# Patient Record
Sex: FEMALE | Race: WHITE | NOT HISPANIC OR LATINO | Employment: OTHER | URBAN - METROPOLITAN AREA
[De-identification: names, ages, dates, MRNs, and addresses within clinical notes are randomized per-mention and may not be internally consistent; named-entity substitution may affect disease eponyms.]

---

## 2022-02-23 ENCOUNTER — TELEPHONE (OUTPATIENT)
Dept: WOUND CARE | Facility: CLINIC | Age: 87
End: 2022-02-23
Payer: MEDICARE

## 2022-02-23 NOTE — TELEPHONE ENCOUNTER
Scheduled patient for 4/4/22 with Dr. Harris but wants sooner.   Please call daughter Vera with any sooner appointments.

## 2022-02-23 NOTE — TELEPHONE ENCOUNTER
New patient.   Daughter Vera called as she knows Dr. Harris and would like him to see her mom for her neck wound which she has had for a very long time.  She had cancer with radiation and bone reconstruction.   She was not in [facility].   Brett will call with insurance info.

## 2022-02-25 ENCOUNTER — HOSPITAL ENCOUNTER (OUTPATIENT)
Dept: WOUND CARE | Facility: CLINIC | Age: 87
Discharge: HOME | End: 2022-02-25
Attending: PHYSICIAN ASSISTANT | Admitting: PHYSICIAN ASSISTANT
Payer: MEDICARE

## 2022-02-25 VITALS
TEMPERATURE: 97.7 F | SYSTOLIC BLOOD PRESSURE: 139 MMHG | HEART RATE: 76 BPM | WEIGHT: 114 LBS | DIASTOLIC BLOOD PRESSURE: 80 MMHG | HEIGHT: 62 IN | BODY MASS INDEX: 20.98 KG/M2

## 2022-02-25 DIAGNOSIS — L98.492 SKIN ULCER OF LEFT SIDE OF NECK WITH FAT LAYER EXPOSED (H): ICD-10-CM

## 2022-02-25 DIAGNOSIS — L97.922 ULCER OF LEFT LOWER EXTREMITY WITH FAT LAYER EXPOSED (H): ICD-10-CM

## 2022-02-25 DIAGNOSIS — L98.491 ULCER, SKIN, CHRONIC, LIMITED TO BREAKDOWN OF SKIN (H): Primary | ICD-10-CM

## 2022-02-25 PROBLEM — G31.84 MILD COGNITIVE IMPAIRMENT: Status: ACTIVE | Noted: 2018-10-24

## 2022-02-25 PROBLEM — D62 POSTOPERATIVE ANEMIA DUE TO ACUTE BLOOD LOSS: Status: ACTIVE | Noted: 2018-08-30

## 2022-02-25 PROBLEM — S72.009A CLOSED FRACTURE OF HIP (H): Status: ACTIVE | Noted: 2018-08-29

## 2022-02-25 PROBLEM — Z85.820 PERSONAL HISTORY OF MALIGNANT MELANOMA OF SKIN: Status: ACTIVE | Noted: 2021-09-17

## 2022-02-25 PROBLEM — I10 ESSENTIAL HYPERTENSION: Status: ACTIVE | Noted: 2022-02-25

## 2022-02-25 PROBLEM — M81.0 OSTEOPOROSIS: Status: ACTIVE | Noted: 2019-07-31

## 2022-02-25 PROBLEM — I44.7 LBBB (LEFT BUNDLE BRANCH BLOCK): Status: ACTIVE | Noted: 2018-08-29

## 2022-02-25 PROBLEM — M48.50XA VERTEBRAL COMPRESSION FRACTURE (H): Status: ACTIVE | Noted: 2019-07-31

## 2022-02-25 PROBLEM — R79.89 ELEVATED BRAIN NATRIURETIC PEPTIDE (BNP) LEVEL: Status: ACTIVE | Noted: 2018-08-30

## 2022-02-25 PROBLEM — R68.84 JAW PAIN: Status: ACTIVE | Noted: 2021-08-26

## 2022-02-25 PROBLEM — J44.9 PULMONARY HYPERTENSION DUE TO CHRONIC OBSTRUCTIVE PULMONARY DISEASE (H): Status: ACTIVE | Noted: 2018-09-03

## 2022-02-25 PROBLEM — I27.23 PULMONARY HYPERTENSION DUE TO CHRONIC OBSTRUCTIVE PULMONARY DISEASE (H): Status: ACTIVE | Noted: 2018-09-03

## 2022-02-25 PROBLEM — R13.10 DYSPHAGIA: Status: ACTIVE | Noted: 2022-02-25

## 2022-02-25 PROBLEM — I16.0 HYPERTENSIVE URGENCY: Status: ACTIVE | Noted: 2021-08-31

## 2022-02-25 PROBLEM — S02.640A: Status: ACTIVE | Noted: 2021-08-31

## 2022-02-25 PROCEDURE — 97602 WOUND(S) CARE NON-SELECTIVE: CPT

## 2022-02-25 PROCEDURE — 99204 OFFICE O/P NEW MOD 45 MIN: CPT | Performed by: PHYSICIAN ASSISTANT

## 2022-02-25 PROCEDURE — G0463 HOSPITAL OUTPT CLINIC VISIT: HCPCS | Mod: 25

## 2022-02-25 RX ORDER — LEVOTHYROXINE SODIUM 75 UG/1
TABLET ORAL
COMMUNITY
Start: 2022-01-09

## 2022-02-25 RX ORDER — TRAZODONE HYDROCHLORIDE 50 MG/1
TABLET, FILM COATED ORAL
COMMUNITY
Start: 2021-09-18

## 2022-02-25 NOTE — PROGRESS NOTES
Patient arrived for wound care visit. Certified Wound Care Nurse time spent evaluating patient record, completed a full evaluation and documented wound(s) & hugh-wound skin; provided recommendation based on treatment plan. Applied dressing, reviewed discharge instructions, patient education, and discussed plan of care with appropriate medical team staff members and patient and/or family members.

## 2022-02-25 NOTE — PROGRESS NOTES
[facility]    ASSESSMENT:   1. 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 Suspect poor healing is due to ischemia from radiation. Discussed that HBOT has been shown to be effective for these type of wounds, however is quite a time commitment. We can certainly make some improvements in local wound care and macro and micronutrients to try to improve tissue perfusion. Encouraged her to discuss HBOT with family and decide if this is something they would like to pursue, I would be happy to place a referral.   2. Wound care plan: vashe soak, plurogel, Hydrofera Blue, gauze, silicone tape changed every other day - do this to neck and leg wounds  3. Nutrition: vit D 5kIU/day, B vitamins, arginaid or Angel, vit C  4. See bottom of note for detailed wound care and patient instructions    HISTORY OF PRESENT ILLNESS:   Eve Reddy is a relatively healthy 88 year old female who presents today for a neck wound. Her daughter is a [employer] friend of Dr. Santy Harris.  History of melanoma with radiation >20 years ago. 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 This was doing well until last year when she developed pain and was noted to have a fracture in the hardware. In August of 2021 the broken portion was removed and the area was closed. Unfortunately she had dehiscence fairly early on. She has done a variety of wound care but sounds like this has mostly been at the suggestion of her surgeon and some nurse friends rather than a wound clinic. Has not discussed hyperbaric with anyone. She has poor nutrition due to trouble swallowing, and survives mostly off of Ensure. Her son inquires about improving diet and micronutrients. She is otherwise remarkably healthy. Also of note, the site of her fibula resection has a shallow ulcer overlying it which waxes and wanes.     VITALS: /80 (BP Location:  "Left arm, Patient Position: Sitting)   Pulse 76   Temp 97.7  F (36.5  C) (Temporal)   Ht 1.575 m (5' 2\")   Wt 51.7 kg (114 lb)   BMI 20.85 kg/m       PHYSICAL EXAM:  GENERAL: Patient is alert and oriented and in no acute distress  CV: palpable pedal pulses  INTEGUMENTARY:   Wound (used by OP [facility]I only) 22 1011 Left neck;jaw (Active)   Base pink;slough 22 1000   Periwound intact 22 1000   Periwound Temperature warm 22 1000   Periwound Skin Turgor soft 22 1000   Edges open 22 1000   Length (cm) 5.1 22 1000   Width (cm) 2.1 22 1000   Depth (cm) 0.1 22 1000   Wound (cm^2) 10.71 cm^2 22 1000   Wound Volume (cm^3) 1.07 cm^3 22 1000   Drainage Characteristics/Odor serous 22 1000   Drainage Amount moderate 22 1000       Wound (used by OP WHI only) 22 1031 Left lateral;lower leg (Active)   Thickness/Stage partial thickness 22 1000   Base scab 22 1000   Periwound intact 22 1000   Periwound Temperature warm 22 1000   Periwound Skin Turgor firm 22 1000   Edges open 22 1000   Length (cm) 8.5 22 1000   Width (cm) 0.8 22 1000   Depth (cm) 0 22 1000   Wound (cm^2) 6.8 cm^2 22 1000   Wound Volume (cm^3) 0 cm^3 22 1000   Drainage Characteristics/Odor serous 22 1000   Drainage Amount moderate 22 1000             MDM: 45-59 minutes were spent on the date of the visit reviewing previous chart notes, evaluating patient and developing the treatment plan, this excludes any time spent on procedures.     PATIENT INSTRUCTIONS      Further instructions from your care team       Eve Reddy      1933    Micronutrients/supplements to aid in healin. 1 packet of Angel Supplement (or L-arginine) into your favorite beverage twice a day. Purchase at [facility] in [address] [employer] or [employer]  2. Vitamin D3 5,000 iu per day  3. Vitamin C 2,000 mg " daily  4. Methyl Folate 1000 mcg daily   5. Vitamin B 12 1000 mcg daily    Wound Dressing Change: left neck and left leg   After cleansing with mild unscented soap and water, apply small amount of VASHE on gauze, lay into wound bed, let sit for 15-30 minutes, remove gauze (do not rinse) then apply dressing:   Cover wound with Plurogel & Hydrofera Blue  Leg:  Wrap with yaneth;  Neck: Secure with silicone tape  (Use skin prep under the tape as needed)  Change dressing every other day    To Order Plurogel: [email or web address] or call [phone] to place an order for 0.7 oz jar or tube  Use PLUROHEALS (all caps) at checkout in the discount code section to decrease price to $55      Raegan Rodriguez PA-C. February 25, 2022    Call us at [phone] if you have any questions about your wounds, have redness or swelling around your wound, have a fever of 101 or greater or if you have any other problems or concerns. We answer the phone Monday through Friday 8 am to 4 pm, please leave a message as we check the voicemail frequently throughout the day.     If you had a positive experience please indicate on your patient satisfaction survey form that [facility] will be sending you.    If you have any billing related questions please call the [facility] Business office at [phone]. The clinic staff does not handle billing related matters.       Durable Medical Equipment Wound Care Orders     Wound Care Order for DME - ONLY FOR DME   As directed      DME Provider: [facility]    Wound Supply Order Options: Complex Wound    Optional: .dmewound can be used to pull in order specific information into documentation    Wound Number:  Wound 1  Wound 2       Wound 1 Location: left neck    Wound 1 Dressing Change Frequency: QOD    Wound 1 Length of Need: 30 days    Wound 1 - Dressing Supplies:  Primary  Tape/Securing       Wound 1 - Primary Dressing Dispensing Instructions: Ok to Substitute    Wound 1 - Primary  Dressing Types: Foam    Wound 1 - Foam Types: Hydrofera Blue Ready Transfer []    Wound 1 - Hydrofera Blue Ready Transfer Size: 4 x 5    Wound 1 - Hydrofera Blue Ready Transfer Quantity: 12    Wound 1 - Tape Type: Silicone []    Wound 1 - Silcone Size: 2 x 5    Wound 1 - Silicone Quantity: 1 Roll    Wound 2 Location: left lower leg    Wound 2 Dressing Change Frequency: QOD    Wound 2 Length of Need: 30 days    Wound 2 - Dressing Supplies:  Primary  Wrap/Gauze  Tape/Securing       Wound 2 - Primary Dressing Dispensing Instructions: Ok to Substitute    Wound 2 - Primary Dressing Types: Foam    Wound 2 - Foam Types: Hydrofera Blue Ready Transfer []    Wound 2 - Hydrofera Blue Ready Transfer Size: 4 x 5    Wound 2 - Hydrofera Blue Ready Transfer Quantity: 12    Wound 2 - Wrap/Gauze Types:  Loafs  Rolls       Wound 2 - Roll Type: Bandage Roll Gauze []    Wound 2 - Bandage Roll Gauze Size: 4.5 x 4.1    Wound 2 - Bandage Roll Gauze Quantity: 30 Rolls    Wound 2 - Loaf Type: Gauze Loaf []    Wound 2 - Gauze Loaf Size: 4 x 4    Wound 2 - Gauze Loaf Quantity: 200    Wound 2 - Tape Type: Medipore []    Wound 2 - Medipore Size: 2 x 10    Wound 2 - Medipore Quantity: 1 Roll    Skin ulcer of left side of neck with fat layer exposed (H)  Ulcer of left lower extremity with fat layer exposed (H)    Wound Care Order for DME - ONLY FOR DME   As directed      DME Provider: [facility]    Wound Supply Order Options: Complex Wound    Optional: .dmewound can be used to pull in order specific information into documentation    Wound Number: Wound 1    Wound 1 Location: left neck and left lower leg    Wound 1 Dressing Change Frequency: QOD    Wound 1 Length of Need: 30 days    Wound 1 - Dressing Supplies: Cleanser    Wound 1 - Cleanser Types: Vashe    Wound 1 - Vashe Cleanser Size: 8.5 oz    Wound 1 - Vashe Cleanser Quantity: 1    Skin ulcer of left side of neck with fat layer exposed (H)  Ulcer of  left lower extremity with fat layer exposed (H)          Electronically signed by Raegan Rodriguez PA-C on February 25, 2022

## 2022-02-25 NOTE — DISCHARGE INSTRUCTIONS
Eve Reddy      1933    Micronutrients/supplements to aid in healin. 1 packet of Angel Supplement (or L-arginine) into your favorite beverage twice a day. Purchase at [facility] [employer] in [address] [employer] or [facility]  2. Vitamin D3 5,000 iu per day  3. Vitamin C 2,000 mg daily  4. Methyl Folate 1000 mcg daily   5. Vitamin B 12 1000 mcg daily    Wound Dressing Change: left neck and left leg   After cleansing with mild unscented soap and water, apply small amount of VASHE on gauze, lay into wound bed, let sit for 15-30 minutes, remove gauze (do not rinse) then apply dressing:   Cover wound with Plurogel & Hydrofera Blue  Leg:  Wrap with yaneth;  Neck: Secure with silicone tape  (Use skin prep under the tape as needed)  Change dressing every other day    To Order Plurogel: [email or web address] or call [phone] to place an order for 0.7 oz jar or tube  Use PLUROHEALS (all caps) at checkout in the discount code section to decrease price to $55      Raegan Rodriguez PA-C. 2022    Call us at [phone] if you have any questions about your wounds, have redness or swelling around your wound, have a fever of 101 or greater or if you have any other problems or concerns. We answer the phone Monday through Friday 8 am to 4 pm, please leave a message as we check the voicemail frequently throughout the day.     If you had a positive experience please indicate on your patient satisfaction survey form that [facility] will be sending you.    If you have any billing related questions please call the [facility] Business office at [phone]. The clinic staff does not handle billing related matters.

## 2022-03-02 ENCOUNTER — TELEPHONE (OUTPATIENT)
Dept: WOUND CARE | Facility: CLINIC | Age: 87
End: 2022-03-02
Payer: MEDICARE

## 2022-03-02 NOTE — TELEPHONE ENCOUNTER
"Vera states that her mom is \"absent minded and keeps taking the dressing off\" so they are going thru a lot of supplies.    Suggested to use a tube sock, cut off the feet and pull up to secure the dressing and prevent her mom from removing the roll gauze. Vera stated that her dad was using the entire 4\" roll gauze and it was falling down pt's leg. Encouraged to use minimal roll gauze to prevent over use of dressings.    Vera was instructed to place the Pluragel into the fridge to use less product.     All concerns were addressed.  "

## 2022-03-02 NOTE — TELEPHONE ENCOUNTER
Eve's daughter Vera called because she is running out of supplies and needs more ordered.   Please call Vera at .

## 2022-04-04 ENCOUNTER — HOSPITAL ENCOUNTER (OUTPATIENT)
Dept: WOUND CARE | Facility: CLINIC | Age: 87
Discharge: HOME | End: 2022-04-04
Attending: SURGERY | Admitting: SURGERY
Payer: MEDICARE

## 2022-04-04 VITALS — TEMPERATURE: 97.4 F | DIASTOLIC BLOOD PRESSURE: 70 MMHG | SYSTOLIC BLOOD PRESSURE: 129 MMHG | HEART RATE: 75 BPM

## 2022-04-04 DIAGNOSIS — L98.491 ULCER, SKIN, CHRONIC, LIMITED TO BREAKDOWN OF SKIN (H): Primary | ICD-10-CM

## 2022-04-04 DIAGNOSIS — L97.922 ULCER OF LEFT LOWER EXTREMITY WITH FAT LAYER EXPOSED (H): ICD-10-CM

## 2022-04-04 DIAGNOSIS — L98.492 SKIN ULCER OF LEFT SIDE OF NECK WITH FAT LAYER EXPOSED (H): ICD-10-CM

## 2022-04-04 PROCEDURE — 99213 OFFICE O/P EST LOW 20 MIN: CPT | Performed by: SURGERY

## 2022-04-04 PROCEDURE — 97602 WOUND(S) CARE NON-SELECTIVE: CPT

## 2022-04-04 NOTE — DISCHARGE INSTRUCTIONS
Eve Reddy      1933    Micronutrients/supplements to aid in healin packet of Angel Supplement (or L-arginine) into your favorite beverage twice a day.  Purchase at [facility] in [address] [facility] [employer] or  [employer]  Vitamin D3 10,000 iu per day  Samantha C 1,000 mg take twice daily  Vitamin B Complex with zinc take 1 tablet daily   Vitamin B 12 1000 mcg daily  Vein Formula 500mg capsule twice daily order from www.[employer] or call [phone]. Alternatively, if Vein Formula is not available Hesperidin Diosmin 1000mg could be ordered from [employer]. [employer] is a good brand.    Wound Dressing Change: Left neck   After cleansing with mild unscented soap and water, apply small amount of VASHE on  gauze, lay into wound bed, let sit for 15-30 minutes by lying on right side, remove gauze (do not rinse) then  apply dressing:  Apply cavilon skin prep to intact skin surrounding wound  Cover wound with Plurogel then 1/8th piece of Hydrofera Blue transfer ready  Secure with medipore tape (Use skin prep under the tape as needed)  Change dressing daily    Wound Dressing Change: Left neck   After cleansing with mild unscented soap and water, apply small amount of VASHE on gauze, lay into wound bed, let sit for 15-30 minutes, remove gauze (do not rinse) then apply dressing:  Apply cavilon skin prep to intact skin surrounding wound  Apply plurogel to wound then apply navy stripe edemawear from toes to knee. Put plurogel in the fridge where it becomes thinner. You will use less of the plurogel this way, extending the use of the tube, and the plurogel will be more soothing.  Edemawear should be worn 24/7 unless bathing/showering or changing the dressing. Then apply 1/6th piece of hydrofera blue transfer ready cut to fit to size of wound over the edemawear and secure with 1 4x4 gauze/1 roll gauze and tape. Change daily.    DO NOT CUT THE EDEMAWEAR. IF IT IS TOO LONG THEN CUFF THE EDEMAWEAR (the  edemawear can shrink length wise with washing)    To Order Plurogel: [email or web address] or call [phone] to place an order for 0.7  oz jar or tube  Use PLUROHEALS (all caps) at checkout in the discount code section to decrease price  to $55     MRishabh Harris M.D. April 4, 2022      Call us at [phone] if you have any questions about your wounds, have redness or swelling around your wound, have a fever of 101 or greater or if you have any other problems or concerns. We answer the phone Monday through Friday 8 am to 4 pm, please leave a message as we check the voicemail frequently throughout the day.     If you had a positive experience please indicate that on your patient satisfaction survey form that [facility] will be sending you.    It was a pleasure meeting with you today.  Thank you for allowing me and my team the privilege of caring for you today.  YOU are the reason we are here, and I truly hope we provided you with the excellent service you deserve.  Please let us know if there is anything else we can do for you so that we can be sure you are leaving completely satisfied with your care experience.      If you have any billing related questions please call the [facility] Business office at [phone]. The clinic staff does not handle billing related matters.

## 2022-04-04 NOTE — PROGRESS NOTES
[facility] Progress Note    Subject: Eve Reddy history of melanoma surgical resection left neck with subsequent extensive radiation, and osteonecrosis of the jaw bone, required plating, subsequent fracture within the past 2 years, required surgical excision at the [facility], subsequent onset of a chronic wound in this position.  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  She essentially lives on 3 cans of Ensure and a daily basis, is maintaining weight, can drink through a straw, can ambulate with a walker, lives with her  of 66 years.  No recurrent malignancy today.    PMH: No past medical history on file.  Patient Active Problem List   Diagnosis     Aseptic necrosis of bone (H)     Cataract     Closed fracture of hip (H)     Closed fracture of ramus of mandible (H)     Vertebral compression fracture (H)     Degeneration of lumbar or lumbosacral intervertebral disc     Dysphagia     Elevated brain natriuretic peptide (BNP) level     Essential hypertension     Hearing loss     Herpes zoster     Personal history of malignant melanoma of skin     Hypertensive urgency     Hypothyroidism     Jaw pain     LBBB (left bundle branch block)     Melanoma of skin (H)     Malignant neoplasm of female breast (H)     Mild cognitive impairment     Osteoporosis     Osteoradionecrosis of mandible     Postoperative anemia due to acute blood loss     Pulmonary eosinophilia (H)     Pulmonary hypertension due to chronic obstructive pulmonary disease (H)     Ulcer, skin, chronic, limited to breakdown of skin (H)     Skin ulcer of left side of neck with fat layer exposed (H)     Ulcer of left lower extremity with fat layer exposed (H)     Social Hx:   Social History     Socioeconomic History     Marital status:      Spouse name: Not on file     Number of children: Not on file     Years of education: Not on file     Highest education level: Not on file   Occupational History     Not  on file   Tobacco Use     Smoking status: Not on file     Smokeless tobacco: Not on file   Substance and Sexual Activity     Alcohol use: Not on file     Drug use: Not on file     Sexual activity: Not on file   Other Topics Concern     Not on file   Social History Narrative     Not on file     Social Determinants of Health     Financial Resource Strain: Not on file   Food Insecurity: Not on file   Transportation Needs: Not on file   Physical Activity: Not on file   Stress: Not on file   Social Connections: Not on file   Intimate Partner Violence: Not on file   Housing Stability: Not on file       Surgical Hx: No past surgical history on file.    Allergies:    Allergies   Allergen Reactions     Alendronic Acid Other (See Comments)     PN: LW Reaction: osteoradionecrosis of mandible  PN: LW Reaction: osteoradionecrosis of mandible       Tramadol Nausea and Vomiting       Medications:   Current Outpatient Medications   Medication     Capsaicin-Menthol 0.025-1.25 % PTCH     levothyroxine (SYNTHROID/LEVOTHROID) 75 MCG tablet     traZODone (DESYREL) 50 MG tablet     No current facility-administered medications for this encounter.       Labs: No results for input(s): ALBUMIN, HGB, INR, WBC, A1C, CRP in the last 53310 hours.    Invalid input(s): PREALBUMIN,  GLUCOSE, MICROBIO  No results found for: CR  No results found for: GFRESTIMATED  No results found for: GFRESTBLACK  No results found for: WBC  No results found for: RBC  No results found for: HGB  No results found for: HCT  No components found for: MCT  No results found for: MCV  No results found for: MCH  No results found for: MCHC  No results found for: RDW  No results found for: PLT       Nutrition requirements were discussed with patient today.  Objective:  /70 (BP Location: Left arm)   Pulse 75   Temp 97.4  F (36.3  C) (Temporal)   Wound (used by OP WHI only) 02/25/22 1011 Left neck;jaw (Active)   Thickness/Stage full thickness 04/04/22 1442   Base  pink;slough 04/04/22 1442   Periwound intact 04/04/22 1442   Periwound Temperature warm 04/04/22 1442   Periwound Skin Turgor soft 04/04/22 1442   Edges open 04/04/22 1442   Length (cm) 4 04/04/22 1442   Width (cm) 1.7 04/04/22 1442   Depth (cm) 0 04/04/22 1442   Wound (cm^2) 6.8 cm^2 04/04/22 1442   Wound Volume (cm^3) 0 cm^3 04/04/22 1442   Wound healing % 36.51 04/04/22 1442   Drainage Characteristics/Odor serous 04/04/22 1442   Drainage Amount moderate 04/04/22 1442   Care, Wound non-select wound debridement performed 04/04/22 1442       Wound (used by OP WHI only) 02/25/22 1031 Left lateral;lower leg (Active)   Thickness/Stage partial thickness 04/04/22 1442   Base scab 04/04/22 1442   Periwound intact 04/04/22 1442   Periwound Temperature warm 04/04/22 1442   Periwound Skin Turgor firm 04/04/22 1442   Edges open 04/04/22 1442   Drainage Characteristics/Odor serous 04/04/22 1442   Drainage Amount moderate 04/04/22 1442   Care, Wound non-select wound debridement performed 04/04/22 1442        General:  Patient is alert and orientated, no acute distress.  Conversant    Vascular: Palpable left pedal pulses.  Significant radiation changes at the left neck, wound improved since evaluation previously in wound clinic, decreased depth, decrease bioburden, area is very hypertrophic, indurated, chronic inflammation.  Left leg wound, lateral calf, shallow ulcerations without cellulitis, no undermining.        Impression: History of melanoma left neck status post radiation therapy, osteonecrosis of left jaw with subsequent fracture of metal plate and surgical incision, 12-month history of left neck wound in the midst of extensive radiation changes and induration, left lateral calf wound and chronic interstitial edema      Plan: Lie on right side, hypochlorous acid Vashe soaked dressings on neck and calf for 15 to 20 minutes, then apply PluroGel, foam, EdemaWear on the left lower extremity, 3M specific Cavilon to periwound  margins left neck, hold in place with Medipore tape, change all dressings on a daily basis, adjunctive micronutrients, Angel 1 pack twice a day, micronized purified flavonoid fraction 1 tablet daily.  Patient will return to the clinic in 6 weeks time.          Owen Harris MD on 4/4/2022 at 2:55 PM          Dictated using Dragon voice recognition software which may result in transcription errors

## 2022-05-18 ENCOUNTER — HOSPITAL ENCOUNTER (OUTPATIENT)
Dept: WOUND CARE | Facility: CLINIC | Age: 87
Discharge: HOME | End: 2022-05-18
Attending: SURGERY | Admitting: SURGERY
Payer: MEDICARE

## 2022-05-18 VITALS — HEART RATE: 78 BPM | SYSTOLIC BLOOD PRESSURE: 151 MMHG | DIASTOLIC BLOOD PRESSURE: 64 MMHG | TEMPERATURE: 97.7 F

## 2022-05-18 DIAGNOSIS — L98.492 SKIN ULCER OF LEFT SIDE OF NECK WITH FAT LAYER EXPOSED (H): ICD-10-CM

## 2022-05-18 DIAGNOSIS — L97.922 ULCER OF LEFT LOWER EXTREMITY WITH FAT LAYER EXPOSED (H): Primary | ICD-10-CM

## 2022-05-18 PROCEDURE — 97602 WOUND(S) CARE NON-SELECTIVE: CPT

## 2022-05-18 PROCEDURE — 99213 OFFICE O/P EST LOW 20 MIN: CPT | Performed by: SURGERY

## 2022-05-18 NOTE — DISCHARGE INSTRUCTIONS
Eve Reddy      1933    Micronutrients/supplements to aid in healin packet of Angel Supplement (or L-arginine) into your favorite beverage twice a day.  Purchase at [facility] in [address] [facility] [employer] or [employer]  Vitamin D3 10,000 iu per day  Samantha C 1,000 mg take daily  Vitamin B Complex with zinc take 1 tablet daily  Vitamin B 12 1000 mcg daily  Vein Formula 500mg capsule twice daily order from [email or web address] or call [phone].     Wound Dressing Change: Left neck  After cleansing with mild unscented soap and water, apply small amount of VASHE on  gauze, lay into wound bed, let sit for 15-30 minutes by lying on right side, remove  gauze (do not rinse) then apply dressing:  Apply cavilon skin prep to intact skin surrounding wound  Cover wound with Plurogel then allow to dry  Change dressing daily    Wound Dressing Change: Left lower leg  Cleanse with mild unscented soap and water.  Apply cavilon skin prep to intact skin surrounding wound  Apply plurogel to wound and allow to dry then apply navy stripe edemawear from toes to knee.   Change daily    Put plurogel in the fridge where it becomes thinner. You will use less of the plurogel  this way, extending the use of the tube, and the plurogel will be more soothing.    Edemawear should be worn 24/7 unless bathing/showering or changing the dressing.    DO NOT CUT THE EDEMAWEAR. IF IT IS TOO LONG THEN CUFF THE EDEMAWEAR (the  edemawear can shrink length wise with washing)    To Order Plurogel: [email or web address] or call [phone] to place an order for 0.7  oz jar or tube  Use PLUROHEALS (all caps) at checkout in the discount code section to decrease price to $55     MRishabh Harris M.D. May 18, 2022      Call us at [phone] if you have any questions about your wounds, have redness or swelling around your wound, have a fever of 101 or greater or if you have any other problems or concerns. We answer the phone Monday through  Friday 8 am to 4 pm, please leave a message as we check the voicemail frequently throughout the day.     If you had a positive experience please indicate that on your patient satisfaction survey form that [facility] will be sending you.    It was a pleasure meeting with you today.  Thank you for allowing me and my team the privilege of caring for you today.  YOU are the reason we are here, and I truly hope we provided you with the excellent service you deserve.  Please let us know if there is anything else we can do for you so that we can be sure you are leaving completely satisfied with your care experience.      If you have any billing related questions please call the [facility] Business office at [phone]. The clinic staff does not handle billing related matters.

## 2022-05-18 NOTE — PROGRESS NOTES
[facility]ound Healing [place] Progress Note    Subject: Eve Reddy radiation and extensive skin damage to left lateral neck status post management of malignancy, left leg wound has closed.  Left neck wound is improved by 62% with utilization of PluroGel and Cavilon to periwound skin margins    Patient Active Problem List   Diagnosis     Aseptic necrosis of bone (H)     Cataract     Closed fracture of hip (H)     Closed fracture of ramus of mandible (H)     Vertebral compression fracture (H)     Degeneration of lumbar or lumbosacral intervertebral disc     Dysphagia     Elevated brain natriuretic peptide (BNP) level     Essential hypertension     Hearing loss     Herpes zoster     Personal history of malignant melanoma of skin     Hypertensive urgency     Hypothyroidism     Jaw pain     LBBB (left bundle branch block)     Melanoma of skin (H)     Malignant neoplasm of female breast (H)     Mild cognitive impairment     Osteoporosis     Osteoradionecrosis of mandible     Postoperative anemia due to acute blood loss     Pulmonary eosinophilia (H)     Pulmonary hypertension due to chronic obstructive pulmonary disease (H)     Ulcer, skin, chronic, limited to breakdown of skin (H)     Skin ulcer of left side of neck with fat layer exposed (H)     Ulcer of left lower extremity with fat layer exposed (H)     Disorder of bone and cartilage     No past medical history on file.  Exam:  BP (!) 151/64 (BP Location: Left arm)   Pulse 78   Temp 97.7  F (36.5  C) (Temporal)   Wound (used by OP WHI only) 02/25/22 1011 Left neck;jaw (Active)   Thickness/Stage full thickness 05/18/22 1252   Base granulating 05/18/22 1252   Periwound intact 05/18/22 1252   Periwound Temperature warm 05/18/22 1252   Periwound Skin Turgor soft 05/18/22 1252   Edges open 05/18/22 1252   Length (cm) 2 05/18/22 1252   Width (cm) 2 05/18/22 1252   Depth (cm) 0.1 05/18/22 1252   Wound (cm^2) 4 cm^2 05/18/22 1252   Wound Volume (cm^3) 0.4 cm^3 05/18/22  1252   Wound healing % 62.65 22 1252   Drainage Characteristics/Odor serosanguineous 22 1252   Drainage Amount moderate 22 1252   Care, Wound non-select wound debridement performed 22 1252       Wound (used by OP WHI only) 22 1031 Left lateral;lower leg (Active)   Thickness/Stage full thickness 22 1252   Base granulating 22 1252   Periwound intact 22 1252   Periwound Temperature warm 22 1252   Periwound Skin Turgor soft 22 1252   Edges open 22 1252   Length (cm) 0.8 22 1252   Width (cm) 0.3 22 1252   Depth (cm) 0 22 1252   Wound (cm^2) 0.24 cm^2 22 1252   Wound Volume (cm^3) 0 cm^3 22 1252   Wound healing % 96.47 22 1252   Drainage Characteristics/Odor serosanguineous 22 1252   Drainage Amount moderate 22 1252   Care, Wound non-select wound debridement performed 22 1252     Improved left neck, mild biofilm, sclerosis, radiation-induced changes of dermis        Impression: History of malignancy left neck with radiation    Plan: We will dress the wounds with PluroGel, Cavilon to periwound skin, ideally would soak with hypochlorous acid for 15 minutes prior to application of PluroGel, can utilize PluroGel only on the neck wound, let completely dry,.  Light compression garment for left lower extremity and ceramide-based lotion application daily  Patient will return to the clinic in 4 weeks time      Further instructions from your care team       Eve Reddy      1933    Micronutrients/supplements to aid in healin packet of Angel Supplement (or L-arginine) into your favorite beverage twice a day.  Purchase at [facility] [employer] in [address] [facility], [employer] or [employer]  Vitamin D3 10,000 iu per day  Samantha C 1,000 mg take daily  Vitamin B Complex with zinc take 1 tablet daily  Vitamin B 12 1000 mcg daily  Vein Formula 500mg capsule twice daily order from [email or web address] or call  [phone].     Wound Dressing Change: Left neck  After cleansing with mild unscented soap and water, apply small amount of VASHE on  gauze, lay into wound bed, let sit for 15-30 minutes by lying on right side, remove  gauze (do not rinse) then apply dressing:  Apply cavilon skin prep to intact skin surrounding wound  Cover wound with Plurogel then allow to dry  Change dressing daily    Wound Dressing Change: Left lower leg  Cleanse with mild unscented soap and water.  Apply cavilon skin prep to intact skin surrounding wound  Apply plurogel to wound and allow to dry then apply navy stripe edemawear from toes to knee.   Change daily    Put plurogel in the fridge where it becomes thinner. You will use less of the plurogel  this way, extending the use of the tube, and the plurogel will be more soothing.    Edemawear should be worn 24/7 unless bathing/showering or changing the dressing.    DO NOT CUT THE EDEMAWEAR. IF IT IS TOO LONG THEN CUFF THE EDEMAWEAR (the  edemawear can shrink length wise with washing)    To Order Plurogel: shop.[employer] or call [phone] to place an order for 0.7  oz jar or tube  Use PLUROHEALS (all caps) at checkout in the discount code section to decrease price to $55     M. Santy Harris M.D. May 18, 2022      Call us at [phone] if you have any questions about your wounds, have redness or swelling around your wound, have a fever of 101 or greater or if you have any other problems or concerns. We answer the phone Monday through Friday 8 am to 4 pm, please leave a message as we check the voicemail frequently throughout the day.     If you had a positive experience please indicate that on your patient satisfaction survey form that [facility] will be sending you.    It was a pleasure meeting with you today.  Thank you for allowing me and my team the privilege of caring for you today.  YOU are the reason we are here, and I truly hope we provided you with the excellent service  you deserve.  Please let us know if there is anything else we can do for you so that we can be sure you are leaving completely satisfied with your care experience.      If you have any billing related questions please call the [facility] Business office at [phone]. The clinic staff does not handle billing related matters.           Owen Harris MD on 5/18/2022 at 4:12 PM      Dictated using Dragon voice recognition software which may result in transcription errors

## 2022-07-13 ENCOUNTER — HOSPITAL ENCOUNTER (OUTPATIENT)
Dept: WOUND CARE | Facility: CLINIC | Age: 87
Discharge: HOME | End: 2022-07-13
Attending: SURGERY | Admitting: SURGERY
Payer: MEDICARE

## 2022-07-13 VITALS — TEMPERATURE: 98 F | HEART RATE: 68 BPM | DIASTOLIC BLOOD PRESSURE: 70 MMHG | SYSTOLIC BLOOD PRESSURE: 148 MMHG

## 2022-07-13 DIAGNOSIS — L97.922 ULCER OF LEFT LOWER EXTREMITY WITH FAT LAYER EXPOSED (H): ICD-10-CM

## 2022-07-13 DIAGNOSIS — L98.492 SKIN ULCER OF LEFT SIDE OF NECK WITH FAT LAYER EXPOSED (H): Primary | ICD-10-CM

## 2022-07-13 PROCEDURE — 97602 WOUND(S) CARE NON-SELECTIVE: CPT

## 2022-07-13 PROCEDURE — 99212 OFFICE O/P EST SF 10 MIN: CPT | Performed by: SURGERY

## 2022-07-13 RX ORDER — LEVOTHYROXINE SODIUM 88 UG/1
88 TABLET ORAL DAILY
COMMUNITY
Start: 2022-06-12

## 2022-07-13 NOTE — DISCHARGE INSTRUCTIONS
Eve Reddy      6/9/1933    Micronutrients/supplements to aid in healing:  -1 packet of Angel Supplement (or L-arginine) into your favorite beverage twice a day  Can purchase at [facility] [address]  -Vitamin D3 10,000 iu per day  -Samantha C 1,000 mg take daily  -Vitamin B Complex with zinc take 1 tablet daily  -Vitamin B 12 1000 mcg daily  -Vein Formula 500mg capsule twice daily order from www.[employer] or call [phone].    Wound Dressing Change: Left Neck  -After cleansing with mild unscented soap and water, apply small amount of Vashe on gauze, lay into wound bed, let sit for 15-30 minutes by lying on right side, remove gauze (do not rinse) then apply dressing:  -Apply one 3M Cavilon Skin Prep the skin around the wound  -Apply a small amount of Plurogel over the wound.  Cover with a Tegarderm transparent film dressing.  -Change every other day    Wound Dressing Change: Left Lower Leg  -Cleanse with mild unscented soap and water  -Moisturize the leg with CeraVe lotion  -Apply one 3M Cavilon Skin Prep to intact skin surrounding wound  -Apply Navy Stripe EdemaWear from toes to knee  -Change daily    You can put Plurogel in the fridge, which will cause it to become thinner. You will use less of the Plurogel this way, extending the use of the tube, and the Plurogel will be more soothing.    To Order Plurogel: shop.[employer] or call [phone] to place an order for 0.7 oz jar or tube  Use PLUROHEALS (all caps) at checkout in the discount code section to decrease price to $55    Edemawear should be worn 24/7 unless bathing/showering or changing the dressing. DO NOT CUT THE EDEMAWEAR. IF IT IS TOO LONG THEN CUFF THE EDEMAWEAR (the EdemaWear can shrink length wise with washing)     BASIA Harris M.D. July 13, 2022    Call us at [phone] if you have any questions about your wounds, have redness or swelling around your wound, have a fever of 101 or  greater or if you have any other problems or concerns. We answer the phone Monday through Friday 8 am to 4 pm, please leave a message as we check the voicemail frequently throughout the day.     If you had a positive experience please indicate that on your patient satisfaction survey form that [facility] will be sending you.    It was a pleasure meeting with you today.  Thank you for allowing me and my team the privilege of caring for you today.  YOU are the reason we are here, and I truly hope we provided you with the excellent service you deserve.  Please let us know if there is anything else we can do for you so that we can be sure you are leaving completely satisfied with your care experience.      If you have any billing related questions please call the [facility] Business office at [phone]. The clinic staff does not handle billing related matters.

## 2022-07-13 NOTE — PROGRESS NOTES
[facility]ound Healing [place] Progress Note    Subject: Eve Reddy, mild cognitive disorder, progressive, pleasant, has been picking at bandages on the left side of the neck which is caused mild stress for the  who is trying to maintain bandage integrity.  Talked about options for management, we will proceed with hypochlorous acid Vashe soak for 15 minutes then application of small mount of PluroGel on the open wound, Cavilon to periwound skin, application of clear Tegaderm, can change every 48 hours, ideally with his low-profile dressing she would not be able to remove it.    Patient Active Problem List   Diagnosis     Aseptic necrosis of bone (H)     Cataract     Closed fracture of hip (H)     Closed fracture of ramus of mandible (H)     Vertebral compression fracture (H)     Degeneration of lumbar or lumbosacral intervertebral disc     Dysphagia     Elevated brain natriuretic peptide (BNP) level     Essential hypertension     Hearing loss     Herpes zoster     Personal history of malignant melanoma of skin     Hypertensive urgency     Hypothyroidism     Jaw pain     LBBB (left bundle branch block)     Melanoma of skin (H)     Malignant neoplasm of female breast (H)     Mild cognitive impairment     Osteoporosis     Osteoradionecrosis of mandible     Postoperative anemia due to acute blood loss     Pulmonary eosinophilia (H)     Pulmonary hypertension due to chronic obstructive pulmonary disease (H)     Ulcer, skin, chronic, limited to breakdown of skin (H)     Skin ulcer of left side of neck with fat layer exposed (H)     Ulcer of left lower extremity with fat layer exposed (H)     Disorder of bone and cartilage     No past medical history on file.  Exam:  BP (!) 148/70 (BP Location: Left leg, Patient Position: Sitting)   Pulse 68   Temp 98  F (36.7  C) (Temporal)   Wound (used by OP WHI only) 02/25/22 1011 Left neck;jaw (Active)   Thickness/Stage full thickness 07/13/22 1200   Base granulating  07/13/22 1200   Periwound intact 07/13/22 1200   Periwound Temperature warm 07/13/22 1200   Periwound Skin Turgor soft 07/13/22 1200   Edges open 07/13/22 1200   Length (cm) 2.5 07/13/22 1200   Width (cm) 1.2 07/13/22 1200   Depth (cm) 0.1 07/13/22 1200   Wound (cm^2) 3 cm^2 07/13/22 1200   Wound Volume (cm^3) 0.3 cm^3 07/13/22 1200   Wound healing % 71.99 07/13/22 1200   Drainage Characteristics/Odor serosanguineous 07/13/22 1200   Drainage Amount moderate 07/13/22 1200   Care, Wound non-select wound debridement performed 07/13/22 1200       Wound (used by OP WHI only) 02/25/22 1031 Left lateral;lower leg (Active)   Thickness/Stage full thickness 07/13/22 1200   Base granulating 07/13/22 1200   Periwound intact 07/13/22 1200   Periwound Temperature warm 07/13/22 1200   Periwound Skin Turgor soft 07/13/22 1200   Edges open 07/13/22 1200   Length (cm) 0.7 07/13/22 1200   Width (cm) 0.4 07/13/22 1200   Depth (cm) 0.1 07/13/22 1200   Wound (cm^2) 0.28 cm^2 07/13/22 1200   Wound Volume (cm^3) 0.03 cm^3 07/13/22 1200   Wound healing % 95.88 07/13/22 1200   Drainage Characteristics/Odor serosanguineous 07/13/22 1200   Drainage Amount moderate 07/13/22 1200   Care, Wound non-select wound debridement performed 07/13/22 1200     Left jaw radiation field with significant induration, wound has improved by 95%, left lateral calf with area of excoriation, no significant edema        Impression: Left jaw wound status post surgical excision of malignancy and radiation, excoriation area left lateral calf    Plan: We will dress the wounds with ceramide-based lotion and EdemaWear to left lower extremity, as noted above for left jaw.  Patient will return to the clinic in 6 weeks time      Further instructions from your care team       Eve Reddy      6/9/1933    Micronutrients/supplements to aid in healing:  -1 packet of Angel Supplement (or L-arginine) into your favorite beverage twice a day  Can purchase at [facility] in [address]  -Vitamin D3 10,000 iu per day  -Samantha C 1,000 mg take daily  -Vitamin B Complex with zinc take 1 tablet daily  -Vitamin B 12 1000 mcg daily  -Vein Formula 500mg capsule twice daily order from www.[employer] or call [phone].    Wound Dressing Change: Left Neck  -After cleansing with mild unscented soap and water, apply small amount of Vashe on gauze, lay into wound bed, let sit for 15-30 minutes by lying on right side, remove gauze (do not rinse) then apply dressing:  -Apply 3M Cavilon Skin Prep to intact skin surrounding wound  -Cover wound with Plurogel then allow to dry  -Change dressing daily    Wound Dressing Change: Left Lower Leg  -Cleanse with mild unscented soap and water  -Apply cavilon skin prep to intact skin surrounding wound  -Apply plurogel to wound and allow to dry then apply navy stripe edemawear from toes to knee  -Change daily    Put Plurogel in the fridge where it becomes thinner. You will use less of the Plurogel this way, extending the use of the tube, and the Plurogel will be more soothing.    To Order Plurogel: shop.[employer] or call [phone] to place an order for 0.7 oz jar or tube  Use PLUROHEALS (all caps) at checkout in the discount code section to decrease price to $55    Edemawear should be worn 24/7 unless bathing/showering or changing the dressing. DO NOT CUT THE EDEMAWEAR. IF IT IS TOO LONG THEN CUFF THE EDEMAWEAR (the EdemaWear can shrink length wise with washing)     BASIA Harris M.D. July 13, 2022    Call us at [phone] if you have any questions about your wounds, have redness or swelling around your wound, have a fever of 101 or greater or if you have any other problems or concerns. We answer the phone Monday through Friday 8 am to 4 pm, please leave a message as we check the voicemail frequently throughout the day.     If you had a positive experience please indicate that on your patient satisfaction  survey form that [facility] will be sending you.    It was a pleasure meeting with you today.  Thank you for allowing me and my team the privilege of caring for you today.  YOU are the reason we are here, and I truly hope we provided you with the excellent service you deserve.  Please let us know if there is anything else we can do for you so that we can be sure you are leaving completely satisfied with your care experience.      If you have any billing related questions please call the [facility] Business office at [phone]. The clinic staff does not handle billing related matters.         Owen Harris MD on 7/13/2022 at 1:08 PM      Dictated using Dragon voice recognition software which may result in transcription errors

## 2022-08-18 ENCOUNTER — HOSPITAL ENCOUNTER (OUTPATIENT)
Dept: WOUND CARE | Facility: CLINIC | Age: 87
Discharge: HOME | End: 2022-08-18
Attending: SURGERY
Payer: MEDICARE

## 2022-08-18 VITALS — TEMPERATURE: 98.5 F | SYSTOLIC BLOOD PRESSURE: 132 MMHG | HEART RATE: 73 BPM | DIASTOLIC BLOOD PRESSURE: 64 MMHG

## 2022-08-18 DIAGNOSIS — L97.922 ULCER OF LEFT LOWER EXTREMITY WITH FAT LAYER EXPOSED (H): Primary | ICD-10-CM

## 2022-08-18 DIAGNOSIS — L98.492 SKIN ULCER OF LEFT SIDE OF NECK WITH FAT LAYER EXPOSED (H): ICD-10-CM

## 2022-08-18 DIAGNOSIS — L98.491 ULCER, SKIN, CHRONIC, LIMITED TO BREAKDOWN OF SKIN (H): ICD-10-CM

## 2022-08-18 PROCEDURE — 99213 OFFICE O/P EST LOW 20 MIN: CPT | Performed by: SURGERY

## 2022-08-18 RX ORDER — TRIAMCINOLONE ACETONIDE 1 MG/G
CREAM TOPICAL
Qty: 45 G | Refills: 3 | Status: SHIPPED | OUTPATIENT
Start: 2022-08-18

## 2022-08-18 NOTE — PROGRESS NOTES
[facility] Wound Healing [place] Progress Note    Subject: Eve Reddy mild progressive dementia, she is unable to not disrupt dressing at left neck, history of malignancy with radiation, chronic ulceration left lateral calf.    Patient Active Problem List   Diagnosis     Aseptic necrosis of bone (H)     Cataract     Closed fracture of hip (H)     Closed fracture of ramus of mandible (H)     Vertebral compression fracture (H)     Degeneration of lumbar or lumbosacral intervertebral disc     Dysphagia     Elevated brain natriuretic peptide (BNP) level     Essential hypertension     Hearing loss     Herpes zoster     Personal history of malignant melanoma of skin     Hypertensive urgency     Hypothyroidism     Jaw pain     LBBB (left bundle branch block)     Melanoma of skin (H)     Malignant neoplasm of female breast (H)     Mild cognitive impairment     Osteoporosis     Osteoradionecrosis of mandible     Postoperative anemia due to acute blood loss     Pulmonary eosinophilia (H)     Pulmonary hypertension due to chronic obstructive pulmonary disease (H)     Ulcer, skin, chronic, limited to breakdown of skin (H)     Skin ulcer of left side of neck with fat layer exposed (H)     Ulcer of left lower extremity with fat layer exposed (H)     Disorder of bone and cartilage     No past medical history on file.  Exam:  /64 (BP Location: Right arm)   Pulse 73   Temp 98.5  F (36.9  C)   Wound (used by OP WHI only) 02/25/22 1011 Left neck;jaw (Active)   Thickness/Stage full thickness 08/18/22 1300   Base granulating 08/18/22 1300   Periwound intact 08/18/22 1300   Periwound Temperature warm 08/18/22 1300   Periwound Skin Turgor firm 08/18/22 1300   Edges open 08/18/22 1300   Length (cm) 2.5 08/18/22 1300   Width (cm) 1.3 08/18/22 1300   Depth (cm) 0.1 08/18/22 1300   Wound (cm^2) 3.25 cm^2 08/18/22 1300   Wound Volume (cm^3) 0.33 cm^3 08/18/22 1300   Wound healing % 69.65 08/18/22 1300   Drainage Characteristics/Odor  serosanguineous 08/18/22 1300   Drainage Amount moderate 08/18/22 1300   Care, Wound chemical cautery applied 08/18/22 1300       Wound (used by OP WHI only) 02/25/22 1031 Left lateral;lower leg (Active)   Thickness/Stage full thickness 08/18/22 1300   Base granulating 08/18/22 1300   Periwound intact 08/18/22 1300   Periwound Temperature warm 08/18/22 1300   Periwound Skin Turgor soft 08/18/22 1300   Edges open 08/18/22 1300   Length (cm) 0.5 08/18/22 1300   Width (cm) 0.2 08/18/22 1300   Depth (cm) 0.1 08/18/22 1300   Wound (cm^2) 0.1 cm^2 08/18/22 1300   Wound Volume (cm^3) 0.01 cm^3 08/18/22 1300   Wound healing % 98.53 08/18/22 1300   Drainage Characteristics/Odor serosanguineous 08/18/22 1300   Drainage Amount moderate 08/18/22 1300   Care, Wound non-select wound debridement performed 08/18/22 1300     See photodocumentation, wound measurements, significant scar tissue left neck mandibular region without cellulitis.  Wound bed itself was treated with silver nitrate.  Left lateral calf with inflammation without cellulitis, no odor, no drainage, no heel ulceration.      Impression: Chronic left neck wound, history of pubic radiation, malignancy, left lateral calf wound    Plan: We will dress the wounds with triamcinolone cream to left neck including wound, apply liberally throughout the day, do not apply an external bandage as the patient disrupts each bandages placed.  Apply triamcinolone cream under Saran wrap for 1 hour 3 times a week to the left leg, ceramide lotion application to skin daily.  Patient will return to the clinic in 6 weeks time        Further instructions from your care team       Eve Barry      6/9/1933    Micronutrients/supplements to aid in healing:  -1 packet of Angel Supplement (or L-arginine) into your favorite beverage twice a day  Can purchase at [facility] in [address], [employer] or [employer]  -Vitamin D3 10,000 iu per day  -Samantha C 1,000 mg take  daily  -Vitamin B Complex with zinc take 1 tablet daily  -Vitamin B 12 1000 mcg daily  -Vein Formula 500mg capsule twice daily order from [email or web address] or call [phone].    Wound Dressing Change: Left Neck  -Apply triamcinolone cream to skin around wound (if it gets in wound, it's okay)  Apply 4-6 times daily    Wound Dressing Change: Left Lower Leg  -Cleanse with mild unscented soap and water  -Apply triamcinolone cream to scar/wounds  -Cover syran wrap; leave in place for 1 hour, then remove  -Moisturize the leg with CeraVe lotion  -Change every other day     BASIA Harris M.D. August 18, 2022    Call us at [phone] if you have any questions about your wounds, have redness or swelling around your wound, have a fever of 101 or greater or if you have any other problems or concerns. We answer the phone Monday through Friday 8 am to 4 pm, please leave a message as we check the voicemail frequently throughout the day.     If you had a positive experience please indicate that on your patient satisfaction survey form that [facility] will be sending you.    It was a pleasure meeting with you today.  Thank you for allowing me and my team the privilege of caring for you today.  YOU are the reason we are here, and I truly hope we provided you with the excellent service you deserve.  Please let us know if there is anything else we can do for you so that we can be sure you are leaving completely satisfied with your care experience.      If you have any billing related questions please call the [facility] Business office at [phone]. The clinic staff does not handle billing related matters.    If you are scheduled have a follow up appointment, you will receive a reminder call the day before your visit. If you are unable to keep that appointment, please call the clinic to cancel or reschedule.           Owen Harris MD on 8/18/2022 at 2:18 PM      Dictated using Dragon voice recognition software  which may result in transcription errors

## 2022-08-18 NOTE — DISCHARGE INSTRUCTIONS
Eve Reddy      6/9/1933    Micronutrients/supplements to aid in healing:  -1 packet of Angel Supplement (or L-arginine) into your favorite beverage twice a day  Can purchase at [facility] [place]  -Vitamin D3 10,000 iu per day  -Samantha C 1,000 mg take daily  -Vitamin B Complex with zinc take 1 tablet daily  -Vitamin B 12 1000 mcg daily  -Vein Formula 500mg capsule twice daily order from [email or web address] or call [phone].    Wound Dressing Change: Left Neck  -Apply triamcinolone cream to skin around wound (if it gets in wound, it's okay)  Apply 4-6 times daily    Wound Dressing Change: Left Lower Leg  -Cleanse with mild unscented soap and water  -Apply triamcinolone cream to scar/wounds  -Cover syran wrap; leave in place for 1 hour, then remove  -Moisturize the leg with CeraVe lotion  -Change every other day     BASIA Harris M.D. August 18, 2022    Call us at [phone] if you have any questions about your wounds, have redness or swelling around your wound, have a fever of 101 or greater or if you have any other problems or concerns. We answer the phone Monday through Friday 8 am to 4 pm, please leave a message as we check the voicemail frequently throughout the day.     If you had a positive experience please indicate that on your patient satisfaction survey form that [facility] will be sending you.    It was a pleasure meeting with you today.  Thank you for allowing me and my team the privilege of caring for you today.  YOU are the reason we are here, and I truly hope we provided you with the excellent service you deserve.  Please let us know if there is anything else we can do for you so that we can be sure you are leaving completely satisfied with your care experience.      If you have any billing related questions please call the [facility] Business office at [phone]. The clinic staff does not handle billing related matters.    If you are  scheduled have a follow up appointment, you will receive a reminder call the day before your visit. If you are unable to keep that appointment, please call the clinic to cancel or reschedule.

## 2022-08-23 ENCOUNTER — TELEPHONE (OUTPATIENT)
Dept: WOUND CARE | Facility: CLINIC | Age: 87
End: 2022-08-23

## 2022-08-23 NOTE — TELEPHONE ENCOUNTER
Called Brett, no answer, left generic message requesting return call.    Reviewed AVS from August 18 visit.    Dressing change frequency is for every other day.    Awaiting return call.    Aryan Etienne RN

## 2022-08-23 NOTE — TELEPHONE ENCOUNTER
"Patient's , Axel, asking for clarification on some recent wound care instructions for the patient. He specifically wanted to confirm/clairfy a portion of the instructions for \"Wound Dressing of the lower left leg\" that states \"Change every other day\". He acknowledges that it might be pretty simple but wants to be sure of his understanding. Please call [phone].    Ok to leave a voicemail.  "

## 2022-08-24 NOTE — TELEPHONE ENCOUNTER
Called Brett.    Clarified that the dressing change (steroid and saran wrap application) should be completed every other day.    Clarified that triamcinolone ointment can be used longer than two weeks.    No further questions.    Aryan Etienne RN

## 2022-09-07 ENCOUNTER — TELEPHONE (OUTPATIENT)
Dept: WOUND CARE | Facility: CLINIC | Age: 87
End: 2022-09-07

## 2022-09-07 NOTE — TELEPHONE ENCOUNTER
Voicemail left with Brett informing him an order for Triamcinolone cream was sent on 8/18/22 with 3 refills to [facility].

## 2022-09-07 NOTE — TELEPHONE ENCOUNTER
Patient is almost out of the Rx cream Triamcinolone. Patient's  (the caller) is asking for 2-3 more tubes if possible and that the renewed RX be sent to [facility] in [place]. Please call the patient or her  when it's been sent    [facility]: [phone]    Axel: [phone]

## 2022-09-27 ENCOUNTER — HOSPITAL ENCOUNTER (OUTPATIENT)
Dept: WOUND CARE | Facility: CLINIC | Age: 87
Discharge: HOME | End: 2022-09-27
Attending: SURGERY | Admitting: SURGERY
Payer: MEDICARE

## 2022-09-27 VITALS — TEMPERATURE: 97.3 F | HEART RATE: 75 BPM | SYSTOLIC BLOOD PRESSURE: 146 MMHG | DIASTOLIC BLOOD PRESSURE: 79 MMHG

## 2022-09-27 DIAGNOSIS — L97.922 ULCER OF LEFT LOWER EXTREMITY WITH FAT LAYER EXPOSED (H): Primary | ICD-10-CM

## 2022-09-27 DIAGNOSIS — L98.492 SKIN ULCER OF LEFT SIDE OF NECK WITH FAT LAYER EXPOSED (H): ICD-10-CM

## 2022-09-27 PROCEDURE — 97602 WOUND(S) CARE NON-SELECTIVE: CPT

## 2022-09-27 PROCEDURE — 99213 OFFICE O/P EST LOW 20 MIN: CPT | Performed by: SURGERY

## 2022-09-27 RX ORDER — LEVOTHYROXINE SODIUM 88 UG/1
1 TABLET ORAL DAILY
COMMUNITY
Start: 2022-09-07

## 2022-09-27 NOTE — DISCHARGE INSTRUCTIONS
Eve Reddy      6/9/1933    A DME order was not completed because supplies were not needed ; was discussed in visit    Micronutrients/supplements to aid in healing:  -1 packet of Angel Supplement (or L-arginine) into your favorite beverage twice a day  Can purchase at [facility] in [address]  -Vitamin D3 10,000 iu per day  -Samantha C 1,000 mg take daily  -Vitamin B Complex with zinc take 1 tablet daily  -Vitamin B 12 1000 mcg daily  -Vein Formula 500mg capsule twice daily order from www.[employer] or call [phone].    Wound Dressing Change: Left Lower Leg, change every other day  -Cleanse with mild unscented soap and water  -Apply triamcinolone cream to scar/wounds  -Cover syran wrap; leave in place for 1 hour, then remove  -Moisturize the leg with CeraVe lotion  -Change every other day    Wound Dressing Change: Left Neck, change every other day  -Start by lying on right side with left neck exposed  -After cleansing with mild unscented soap and water, apply small amount of Vashe on  gauze, lay into wound bed, let sit for 15-30 minutes by lying on right side, remove  gauze (do not rinse) then apply dressing:  -Apply one 3M Cavilon Skin Prep the skin around the wound  -Apply a small amount of Plurogel over the wound and extending the layer of Plurogel outside the edge of wound by about 1 cm  -Apply Bandaid or similar dressing with 4 sided adhesive  -Change every other day    Put plurogel in the fridge where it becomes thinner. You will use less of the plurogel this way, extending the use of the tube, and the plurogel will be more soothing.    To Order more plurogel: shop.[email or web address] or call [phone] to place an order for 0.7 oz tube  Use PLUROHEALS (all caps) at checkout in the discount code section to decrease price to $55      M. Santy Harris M.D. September 27, 2022    Call us at [phone] if you have any questions about your wounds, have redness or  swelling around your wound, have a fever of 101 or greater or if you have any other problems or concerns. We answer the phone Monday through Friday 8 am to 4 pm, please leave a message as we check the voicemail frequently throughout the day.     If you had a positive experience please indicate that on your patient satisfaction survey form that [facility] will be sending you.    It was a pleasure meeting with you today.  Thank you for allowing me and my team the privilege of caring for you today.  YOU are the reason we are here, and I truly hope we provided you with the excellent service you deserve.  Please let us know if there is anything else we can do for you so that we can be sure you are leaving completely satisfied with your care experience.      If you have any billing related questions please call the [facility] Business office at [phone]. The clinic staff does not handle billing related matters.    If you are scheduled to have a follow up appointment, you will receive a reminder call the day before your visit. On the appointment day please arrive 15 minutes prior to your appointment time. If you are unable to keep that appointment, please call the clinic to cancel or reschedule. If you are more than 10 minutes late or greater for your appointment, the clinic policy is that you may be asked to reschedule.

## 2022-09-27 NOTE — PROGRESS NOTES
[facility] [place] Progress Note    Subject: Eve Reddy left lateral mandibular ulceration without bone exposure, history of radiation treatment for malignancy, mild cognitive impairment,  performing dressing changes.  He is concerned about the lack of progress, we have previously altered dressing regime to a steroid cream to the periwound margin to decrease histamine as he felt the patient was manipulating this area.    Patient Active Problem List   Diagnosis     Aseptic necrosis of bone (H)     Cataract     Closed fracture of hip (H)     Closed fracture of ramus of mandible (H)     Vertebral compression fracture (H)     Degeneration of lumbar or lumbosacral intervertebral disc     Dysphagia     Elevated brain natriuretic peptide (BNP) level     Essential hypertension     Hearing loss     Herpes zoster     Personal history of malignant melanoma of skin     Hypertensive urgency     Hypothyroidism     Jaw pain     LBBB (left bundle branch block)     Melanoma of skin (H)     Malignant neoplasm of female breast (H)     Mild cognitive impairment     Osteoporosis     Osteoradionecrosis of mandible     Postoperative anemia due to acute blood loss     Pulmonary eosinophilia (H)     Pulmonary hypertension due to chronic obstructive pulmonary disease (H)     Ulcer, skin, chronic, limited to breakdown of skin (H)     Skin ulcer of left side of neck with fat layer exposed (H)     Ulcer of left lower extremity with fat layer exposed (H)     Disorder of bone and cartilage     No past medical history on file.  Exam:  BP (!) 146/79 (BP Location: Left arm, Patient Position: Chair)   Pulse 75   Temp 97.3  F (36.3  C)   Wound (used by OP [facility]I only) 02/25/22 1011 Left neck;jaw (Active)   Thickness/Stage full thickness 09/27/22 1308   Base slough 09/27/22 1308   Periwound intact 09/27/22 1308   Periwound Temperature warm 09/27/22 1308   Periwound Skin Turgor firm 09/27/22 1308   Edges open 09/27/22 1308    Length (cm) 3.4 09/27/22 1308   Width (cm) 4 09/27/22 1308   Depth (cm) 0.1 09/27/22 1308   Wound (cm^2) 13.6 cm^2 09/27/22 1308   Wound Volume (cm^3) 1.36 cm^3 09/27/22 1308   Wound healing % -26.98 09/27/22 1308   Drainage Characteristics/Odor serosanguineous 09/27/22 1308   Drainage Amount moderate 09/27/22 1308   Care, Wound non-select wound debridement performed 09/27/22 1308       Wound (used by OP [facility]I only) 02/25/22 1031 Left lateral;lower leg (Active)   Thickness/Stage full thickness 09/27/22 1308   Base granulating 09/27/22 1308   Periwound intact 09/27/22 1308   Periwound Temperature warm 09/27/22 1308   Periwound Skin Turgor soft 09/27/22 1308   Edges open 09/27/22 1308   Length (cm) 0.8 09/27/22 1308   Width (cm) 0.4 09/27/22 1308   Depth (cm) 0.1 09/27/22 1308   Wound (cm^2) 0.32 cm^2 09/27/22 1308   Wound Volume (cm^3) 0.03 cm^3 09/27/22 1308   Wound healing % 95.29 09/27/22 1308   Drainage Characteristics/Odor serosanguineous 09/27/22 1308   Drainage Amount moderate 09/27/22 1308   Care, Wound non-select wound debridement performed 09/27/22 1308     Dry wound left mandibular region without bone exposure or cellulitis.  Closed wound left lateral calf.        Impression: Chronic wound left lateral mandibular region without exposed bone, closed left lateral leg ulceration, mild cognitive impairment    Plan: We will dress the wounds with PluroGel application to left mandibular wound after soaking with Vashe hypochlorous acid for approximately 15 minutes while the patient lies in her right side.  Ever with a 4 sided Band-Aid, specifically not Mepilex as this will not be covered..  We offered home care, patient's  declined.  Patient will return to the clinic in 6 weeks time      Further instructions from your care team       Eve Reddy      6/9/1933    A DME order was not completed because supplies were not needed ; was discussed in visit    Micronutrients/supplements to aid in healing:  -1  packet of Angel Supplement (or L-arginine) into your favorite beverage twice a day  Can purchase at [facility] in [address] [facility] or [employer]  -Vitamin D3 10,000 iu per day  -Samantha C 1,000 mg take daily  -Vitamin B Complex with zinc take 1 tablet daily  -Vitamin B 12 1000 mcg daily  -Vein Formula 500mg capsule twice daily order from www.[employer] or call [phone].    Wound Dressing Change: Left Lower Leg, change every other day  -Cleanse with mild unscented soap and water  -Apply triamcinolone cream to scar/wounds  -Cover syran wrap; leave in place for 1 hour, then remove  -Moisturize the leg with CeraVe lotion  -Change every other day    Wound Dressing Change: Left Neck, change every other day  -Start by lying on right side with left neck exposed  -After cleansing with mild unscented soap and water, apply small amount of Vashe on  gauze, lay into wound bed, let sit for 15-30 minutes by lying on right side, remove  gauze (do not rinse) then apply dressing:  -Apply one 3M Cavilon Skin Prep the skin around the wound  -Apply a small amount of Plurogel over the wound and extending the layer of Plurogel outside the edge of wound by about 1 cm  -Apply Bandaid or similar dressing with 4 sided adhesive  -Change every other day    Put plurogel in the fridge where it becomes thinner. You will use less of the plurogel this way, extending the use of the tube, and the plurogel will be more soothing.    To Order more plurogel: shop.[employer] or call [phone] to place an order for 0.7 oz tube  Use PLUROHEALS (all caps) at checkout in the discount code section to decrease price to $55      M. Santy Harris M.D. September 27, 2022    Call us at [phone] if you have any questions about your wounds, have redness or swelling around your wound, have a fever of 101 or greater or if you have any other problems or concerns. We answer the phone Monday through Friday 8 am to 4 pm, please  leave a message as we check the voicemail frequently throughout the day.     If you had a positive experience please indicate that on your patient satisfaction survey form that [facility] will be sending you.    It was a pleasure meeting with you today.  Thank you for allowing me and my team the privilege of caring for you today.  YOU are the reason we are here, and I truly hope we provided you with the excellent service you deserve.  Please let us know if there is anything else we can do for you so that we can be sure you are leaving completely satisfied with your care experience.      If you have any billing related questions please call the [facility] Business office at [phone]. The clinic staff does not handle billing related matters.    If you are scheduled to have a follow up appointment, you will receive a reminder call the day before your visit. On the appointment day please arrive 15 minutes prior to your appointment time. If you are unable to keep that appointment, please call the clinic to cancel or reschedule. If you are more than 10 minutes late or greater for your appointment, the clinic policy is that you may be asked to reschedule.         Owen Harris MD on 9/27/2022 at 2:01 PM          Dictated using Dragon voice recognition software which may result in transcription errors

## 2022-11-09 ENCOUNTER — HOSPITAL ENCOUNTER (OUTPATIENT)
Dept: WOUND CARE | Facility: CLINIC | Age: 87
Discharge: HOME | End: 2022-11-09
Attending: SURGERY | Admitting: SURGERY
Payer: MEDICARE

## 2022-11-09 VITALS — HEART RATE: 71 BPM | TEMPERATURE: 98.1 F | SYSTOLIC BLOOD PRESSURE: 120 MMHG | DIASTOLIC BLOOD PRESSURE: 75 MMHG

## 2022-11-09 DIAGNOSIS — L97.922 ULCER OF LEFT LOWER EXTREMITY WITH FAT LAYER EXPOSED (H): Primary | ICD-10-CM

## 2022-11-09 DIAGNOSIS — L98.492 SKIN ULCER OF LEFT SIDE OF NECK WITH FAT LAYER EXPOSED (H): ICD-10-CM

## 2022-11-09 PROCEDURE — 99213 OFFICE O/P EST LOW 20 MIN: CPT | Performed by: SURGERY

## 2022-11-09 PROCEDURE — 97602 WOUND(S) CARE NON-SELECTIVE: CPT

## 2022-11-09 NOTE — DISCHARGE INSTRUCTIONS
Eve Reddy      6/9/1933    A DME order was not completed because supplies were not needed    Micronutrients/supplements to aid in healing:  -Vitamin D3 2,000 iu per day  -Samantha C 500 mg take daily  -Vitamin B Complex with zinc take 1 tablet daily  -Vitamin B 12 1000 mcg daily    Wound Dressing Change, daily & as needed   -Cleanse with mild unscented soap and water  -Apply A&D ointment or Plurogel    To Order more plurogel: shop.[employer] or call [phone] to place an order for 0.7 oz tube  Use discount code PLUROHEALS (all caps) at checkout in the discount code section to decrease price to $55     Keep nails SHORT and clean    CeraVe to bilateral legs daily      BASIA Harris M.D. November 9, 2022    ROUTINE FOOT CARE (NAIL TRIMMING / CALLUSES)    Go to afcna.org ([employer] Foot [employer] Nurses Association) and search for providers near you.  Otherwise, this is a list we have gathered of  recommended locations/providers in [place].    [facility]   [phone]   [facility]  [phone]  [email or web address]   [facility]  [phone]  [place] [facility]  [phone]  [email or web address]   [facility]  [address] [place] [address]  [phone] Pedrito Mayfield DPM  [address] [place] [address] [phone]   [facility]  [phone] [address] [place] [address]  [facility]  Dr. Tim Ramirez  [phone]  [facility]  [phone]  [place] & [place] Locations  (does not take BCBS) FYI:  *Some providers accept insurance while others are out of pocket. Please contact them for details*        Call us at [phone] if you have any questions about your wounds, have redness or swelling around your wound, have a fever of 101 or greater or if you have any other problems or concerns. We answer the phone Monday through Friday 8 am to 4 pm, please leave a message as we check the voicemail  frequently throughout the day.     If you had a positive experience please indicate that on your patient satisfaction survey form that [facility] will be sending you.    It was a pleasure meeting with you today.  Thank you for allowing me and my team the privilege of caring for you today.  YOU are the reason we are here, and I truly hope we provided you with the excellent service you deserve.  Please let us know if there is anything else we can do for you so that we can be sure you are leaving completely satisfied with your care experience.      If you have any billing related questions please call the [facility] Business office at [phone]. The clinic staff does not handle billing related matters.    If you are scheduled to have a follow up appointment, you will receive a reminder call the day before your visit. On the appointment day please arrive 15 minutes prior to your appointment time. If you are unable to keep that appointment, please call the clinic to cancel or reschedule. If you are more than 10 minutes late or greater for your appointment, the clinic policy is that you may be asked to reschedule.    no

## 2022-11-09 NOTE — PROGRESS NOTES
[facility] [place] Progress Note    Subject: Eve Reddy progressive decline in cognition, dementia, short-term memory loss, consistent manipulation of left side of neck which she has an open wound and previous radiation therapy for a malignancy.   is present, daughter present.   has been changing dressing 4-6 times daily basis.  We discussed palliative cares with guards to the wound, will trial A&E ointment as we have trialed extensive other options, wound had been resolving and responding at 1 point with use of PluroGel however over the course of time, her cognitive decline is resulted in inability of her to cooperate consistently due to short-term memory.  Her fingernails will be maintained short.  Discussed utilization of gloves, family will consider.    Patient Active Problem List   Diagnosis     Aseptic necrosis of bone (H)     Cataract     Closed fracture of hip (H)     Closed fracture of ramus of mandible (H)     Vertebral compression fracture (H)     Degeneration of lumbar or lumbosacral intervertebral disc     Dysphagia     Elevated brain natriuretic peptide (BNP) level     Essential hypertension     Hearing loss     Herpes zoster     Personal history of malignant melanoma of skin     Hypertensive urgency     Hypothyroidism     Jaw pain     LBBB (left bundle branch block)     Melanoma of skin (H)     Malignant neoplasm of female breast (H)     Mild cognitive impairment     Osteoporosis     Osteoradionecrosis of mandible     Postoperative anemia due to acute blood loss     Pulmonary eosinophilia (H)     Pulmonary hypertension due to chronic obstructive pulmonary disease (H)     Ulcer, skin, chronic, limited to breakdown of skin (H)     Skin ulcer of left side of neck with fat layer exposed (H)     Ulcer of left lower extremity with fat layer exposed (H)     Disorder of bone and cartilage     No past medical history on file.  Exam:  /75 (BP Location: Left arm, Patient  Position: Sitting)   Pulse 71   Temp 98.1  F (36.7  C) (Temporal)   Wound (used by OP [facility] only) 02/25/22 1011 Left neck;jaw (Active)   Thickness/Stage full thickness 11/09/22 1227   Base slough 11/09/22 1227   Periwound redness 11/09/22 1227   Periwound Temperature warm 11/09/22 1227   Periwound Skin Turgor firm 11/09/22 1227   Edges open 11/09/22 1227   Length (cm) 4.8 11/09/22 1227   Width (cm) 1.7 11/09/22 1227   Depth (cm) 0.1 11/09/22 1227   Wound (cm^2) 8.16 cm^2 11/09/22 1227   Wound Volume (cm^3) 0.82 cm^3 11/09/22 1227   Wound healing % 23.81 11/09/22 1227   Drainage Characteristics/Odor serosanguineous 11/09/22 1227   Drainage Amount moderate 11/09/22 1227   Care, Wound non-select wound debridement performed 11/09/22 1227     Short-term memory loss, 89-year-old female, cognitive decline, left jawline ulceration with periwound chronic inflammatory changes secondary to radiation therapy, soft tissue contracture.  No cellulitis.        Impression: Chronic progressive cognitive decline, left neck ulceration, history of radiation therapy    Plan: Discussed the possibility that this could be squamous cell or basal cell malignancy, biopsy could be considered if the wound is enlarging in size, family would like to consider at this point.  Discussed meaning of palliative care with regard specifically to the left neck wound, attempting to stabilize, patient manipulates often given short-term memory deficit.  We will trial A&E ointment though if not of any benefit, would return to PluroGel application multiple times per day.  They will trim her fingernails.  They will also obtain podiatry evaluation for foot cares, markedly thickened nail trimming of the feet.  We will follow-up in approximately 6 to 8 weeks for reevaluation.        Further instructions from your care team         Eve Reddy      6/9/1933    A DME order was not completed because supplies were not needed    Micronutrients/supplements to aid in  healing:  -Vitamin D3 2,000 iu per day  -Samantha C 500 mg take daily  -Vitamin B Complex with zinc take 1 tablet daily  -Vitamin B 12 1000 mcg daily    Wound Dressing Change, daily & as needed   -Cleanse with mild unscented soap and water  -Apply A&D ointment or Plurogel    To Order more plurogel: [email or web address] or call [phone] to place an order for 0.7 oz tube  Use discount code PLUROHEALS (all caps) at checkout in the discount code section to decrease price to $55     Keep nails SHORT and clean    CeraVe to bilateral legs daily      BASIA Harris M.D. November 9, 2022    ROUTINE FOOT CARE (NAIL TRIMMING / CALLUSES)    Go to afcna.org ([employer] Association) and search for providers near you.  Otherwise, this is a list we have gathered of  recommended locations/providers in [place].    [facility]   [phone]   [facility]  [phone]  [email or web address]   [facility]  [phone]  [place] mile [place] [facility]  [phone]  [email or web address]   [facility]  [address] [place] [address] [phone] Pedrito Mayfield DPM  [address] [place] [address] [phone]   [facility]  [phone] [address] [place] [address]  [facility]  Dr. Tim Ramirez  [phone]  [facility]  [phone]  [place] & [place] Locations  (does not take BCBS) FYI:  *Some providers accept insurance while others are out of pocket. Please contact them for details*        Call us at [phone] if you have any questions about your wounds, have redness or swelling around your wound, have a fever of 101 or greater or if you have any other problems or concerns. We answer the phone Monday through Friday 8 am to 4 pm, please leave a message as we check the voicemail frequently throughout the day.     If you had a positive experience please indicate that on your patient satisfaction survey form  that [facility] will be sending you.    It was a pleasure meeting with you today.  Thank you for allowing me and my team the privilege of caring for you today.  YOU are the reason we are here, and I truly hope we provided you with the excellent service you deserve.  Please let us know if there is anything else we can do for you so that we can be sure you are leaving completely satisfied with your care experience.      If you have any billing related questions please call the [facility] Business office at [phone]. The clinic staff does not handle billing related matters.    If you are scheduled to have a follow up appointment, you will receive a reminder call the day before your visit. On the appointment day please arrive 15 minutes prior to your appointment time. If you are unable to keep that appointment, please call the clinic to cancel or reschedule. If you are more than 10 minutes late or greater for your appointment, the clinic policy is that you may be asked to reschedule.           Owen Harris MD on 11/9/2022 at 2:38 PM      Dictated using Dragon voice recognition software which may result in transcription errors

## 2023-01-19 ENCOUNTER — HOSPITAL ENCOUNTER (OUTPATIENT)
Dept: WOUND CARE | Facility: CLINIC | Age: 88
Discharge: HOME | End: 2023-01-19
Attending: SURGERY | Admitting: SURGERY
Payer: MEDICARE

## 2023-01-19 VITALS — SYSTOLIC BLOOD PRESSURE: 150 MMHG | DIASTOLIC BLOOD PRESSURE: 71 MMHG | HEART RATE: 77 BPM

## 2023-01-19 DIAGNOSIS — L98.491 ULCER, SKIN, CHRONIC, LIMITED TO BREAKDOWN OF SKIN (H): ICD-10-CM

## 2023-01-19 PROCEDURE — 99213 OFFICE O/P EST LOW 20 MIN: CPT | Performed by: SURGERY

## 2023-01-19 PROCEDURE — 97602 WOUND(S) CARE NON-SELECTIVE: CPT

## 2023-01-19 NOTE — DISCHARGE INSTRUCTIONS
Eve Reddy      6/9/1933    A DME order was not completed because doctor recommending OTC product         Micronutrients/supplements to aid in healing:  -Vitamin D3 2,000 iu per day  -Samantha C 500 mg take daily  -Vitamin B Complex with zinc take 1 tablet daily  -Vitamin B 12 1000 mcg daily    Wound Dressing recommendations  LEFT neck / jaw   -Cleanse with mild unscented soap and water, pat dry  -Apply BIAFINE emulsion (OTC), please order online    Repeat every day or more often as need it    Schedule appointment with [facility] for US treatment evaluation      BASIA Harris M.D. January 19, 2023    Call us at [phone] if you have any questions about your wounds, have redness or swelling around your wound, have a fever of 101 or greater or if you have any other problems or concerns. We answer the phone Monday through Friday 8 am to 4 pm, please leave a message as we check the voicemail frequently throughout the day.     If you had a positive experience please indicate that on your patient satisfaction survey form that [facility] will be sending you.    It was a pleasure meeting with you today.  Thank you for allowing me and my team the privilege of caring for you today.  YOU are the reason we are here, and I truly hope we provided you with the excellent service you deserve.  Please let us know if there is anything else we can do for you so that we can be sure you are leaving completely satisfied with your care experience.      If you have any billing related questions please call the [facility] Business office at [phone]. The clinic staff does not handle billing related matters.    If you are scheduled to have a follow up appointment, you will receive a reminder call the day before your visit. On the appointment day please arrive 15 minutes prior to your appointment time. If you are unable to keep that appointment, please call the clinic to cancel or reschedule. If you are more than 10  minutes late or greater for your appointment, the clinic policy is that you may be asked to reschedule.

## 2023-01-19 NOTE — PROGRESS NOTES
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  Original consult was April 4, 2022, mild gains been made.  I recommended consultation at [facility] for consideration of ultrasound-guided therapy, discussed with family that this require visits to 3 times a week for likely minimum 6 weeks to determine if this is of benefit, most significant issue is the patient's short-term memory and inability to with manipulating the wound of the left neck.    PMH: No past medical history on file.  Patient Active Problem List   Diagnosis     Aseptic necrosis of bone (H)     Cataract     Closed fracture of hip (H)     Closed fracture of ramus of mandible (H)     Vertebral compression fracture (H)     Degeneration of lumbar or lumbosacral intervertebral disc     Dysphagia     Elevated brain natriuretic peptide (BNP) level     Essential hypertension     Hearing loss     Herpes zoster     Personal history of malignant melanoma of skin     Hypertensive urgency     Hypothyroidism     Jaw pain     LBBB (left bundle branch block)     Melanoma of skin (H)     Malignant neoplasm of female breast (H)     Mild cognitive impairment     Osteoporosis     Osteoradionecrosis of mandible     Postoperative anemia due to acute blood loss     Pulmonary eosinophilia (H)     Pulmonary hypertension due to chronic obstructive pulmonary disease (H)     Ulcer, skin, chronic, limited to breakdown of skin (H)     Skin ulcer of left side of neck with fat layer exposed (H)     Ulcer of left lower extremity with fat layer exposed (H)     Disorder of bone and cartilage      Social Hx:   Social History     Socioeconomic History     Marital status:      Spouse name: Not on file     Number of children: Not on file     Years of education: Not on file     Highest education level: Not on file   Occupational History     Not on file   Tobacco Use     Smoking status: Not on file     Smokeless tobacco: Not on file   Substance and Sexual Activity     Alcohol use: Not on file     Drug use: Not on file     Sexual activity: Not on file   Other Topics Concern     Not on file   Social History Narrative     Not on file     Social Determinants of Health     Financial Resource Strain: Not on file   Food Insecurity: Not on file   Transportation Needs: Not on file   Physical Activity: Not on file   Stress: Not on file   Social Connections: Not on file   Intimate Partner Violence: Not on file   Housing Stability: Not on file       Surgical Hx: No past surgical history on file.    Allergies:    Allergies   Allergen Reactions     Alendronic Acid Other (See Comments)     PN: LW Reaction: osteoradionecrosis of mandible  PN: LW Reaction: osteoradionecrosis of mandible       Tramadol Nausea and Vomiting       Medications:   Current Outpatient Medications   Medication     Capsaicin-Menthol 0.025-1.25 % PTCH     levothyroxine (SYNTHROID/LEVOTHROID) 75 MCG tablet     levothyroxine (SYNTHROID/LEVOTHROID) 88 MCG tablet     levothyroxine (SYNTHROID/LEVOTHROID) 88 MCG tablet     traZODone (DESYREL) 50 MG tablet     triamcinolone (KENALOG) 0.1 % external cream     No current facility-administered medications for this encounter.       Labs: No results for input(s): ALBUMIN, HGB, INR, WBC, A1C, CRP in the last 26916 hours.    Invalid input(s): PREALBUMIN,  GLUCOSE, MICROBIO  No results found for: CR  No results found for: GFRESTIMATED  No results found for: GFRESTBLACK  No results found for: WBC  No results found for: RBC  No results found for: HGB  No results found for: HCT  No components found for: MCT  No  results found for: MCV  No results found for: MCH  No results found for: MCHC  No results found for: RDW        Nutrition requirements were discussed with patient today.  Objective:  BP (!) 150/71   Pulse 77   Wound (used by OP [facility] only) 02/25/22 1011 Left neck;jaw (Active)       Wound (used by OP [facility] only) 01/19/23 1229 Left neck (Active)   Thickness/Stage full thickness 01/19/23 1200   Length (cm) 1.5 01/19/23 1200   Width (cm) 7 01/19/23 1200   Depth (cm) 0.2 01/19/23 1200   Wound (cm^2) 10.5 cm^2 01/19/23 1200   Wound Volume (cm^3) 2.1 cm^3 01/19/23 1200   Drainage Characteristics/Odor serosanguineous 01/19/23 1200   Drainage Amount moderate 01/19/23 1200        General:  Patient is alert and orientated, no acute distress.  Pleasant, short-term memory deficits.  No cellulitis of the left neck, no significant laboratory changes, wound is biofilm present.  Induration present.        Impression: History of radiation therapy for malignancy, left neck      Plan: Recommend consult at [facility] for consideration of ultrasonic mist therapy, discussed and reviewed with the family, this would require 2-3 times per week, probable trial of duration of 4 to 6 weeks to determine if this has any benefit.  We will try biofine application on a daily basis to determine if this helps with decreasing inflammation.  All other considerations performed with putting gloves on, coloring, etc., she would not maintain any of these on her body.   is performing with an excellent job of managing the wound near constant basis.  Patient will return to the clinic in 8 weeks time.       Further instructions from your care team       Eve Reddy      6/9/1933    A DME order was not completed because doctor recommending OTC product         Micronutrients/supplements to aid in healing:  -Vitamin D3 2,000 iu per day  -Samantha C 500 mg take daily  -Vitamin B Complex with zinc take 1 tablet daily  -Vitamin B 12 1000 mcg  daily    Wound Dressing recommendations  LEFT neck / jaw   -Cleanse with mild unscented soap and water, pat dry  -Apply BIAFINE emulsion (OTC), please order online    Repeat every day or more often as need it    Schedule appointment with [facility] for US treatment evaluation      BASIA Harris M.D. January 19, 2023    Call us at [phone] if you have any questions about your wounds, have redness or swelling around your wound, have a fever of 101 or greater or if you have any other problems or concerns. We answer the phone Monday through Friday 8 am to 4 pm, please leave a message as we check the voicemail frequently throughout the day.     If you had a positive experience please indicate that on your patient satisfaction survey form that [facility] will be sending you.    It was a pleasure meeting with you today.  Thank you for allowing me and my team the privilege of caring for you today.  YOU are the reason we are here, and I truly hope we provided you with the excellent service you deserve.  Please let us know if there is anything else we can do for you so that we can be sure you are leaving completely satisfied with your care experience.      If you have any billing related questions please call the [facility] Business office at [phone]. The clinic staff does not handle billing related matters.    If you are scheduled to have a follow up appointment, you will receive a reminder call the day before your visit. On the appointment day please arrive 15 minutes prior to your appointment time. If you are unable to keep that appointment, please call the clinic to cancel or reschedule. If you are more than 10 minutes late or greater for your appointment, the clinic policy is that you may be asked to reschedule.              Owen Harris MD on 1/19/2023 at 12:41 PM        Dictated using Dragon voice recognition software which may result in transcription errors

## 2023-01-24 ENCOUNTER — TELEPHONE (OUTPATIENT)
Dept: WOUND CARE | Facility: CLINIC | Age: 88
End: 2023-01-24
Payer: MEDICARE

## 2023-01-24 ENCOUNTER — MEDICAL CORRESPONDENCE (OUTPATIENT)
Dept: HEALTH INFORMATION MANAGEMENT | Facility: CLINIC | Age: 88
End: 2023-01-24

## 2023-01-24 NOTE — TELEPHONE ENCOUNTER
Patient's daughter is requesting the orders for the US mist that Dr Harris wanted done would be faxed to [facility]. Fax number is [phone]

## 2023-01-27 ENCOUNTER — TELEPHONE (OUTPATIENT)
Dept: WOUND CARE | Facility: CLINIC | Age: 88
End: 2023-01-27
Payer: MEDICARE

## 2023-01-27 NOTE — TELEPHONE ENCOUNTER
Patient ordered Biasineact from [employer].  They are looking for instructions on use as everything that came is in Georgian.  Vera

## 2023-01-27 NOTE — TELEPHONE ENCOUNTER
Returned call to Vera. Directed her to apply the Biafine like a lotion morning and night and increasing the frequency if needed if itching continues. Vera verbalized understanding. No further questions or concerns.

## 2023-05-04 ENCOUNTER — TELEPHONE (OUTPATIENT)
Dept: WOUND CARE | Facility: CLINIC | Age: 88
End: 2023-05-04
Payer: MEDICARE

## 2023-05-04 NOTE — TELEPHONE ENCOUNTER
Returned call to Vera. Discussed with that antibiotic concerns should go through the patients PCP as Dr. Harris did not prescribe the antibiotics. Vera stated the ER doctor was possibly going to admit the patient for IV antibiotics but opted not to. Vera will discuss this with PCP. Appointment made for 5/8/23 with Dr. Harris to assess the wound if the patient is not admitted.

## 2023-05-04 NOTE — TELEPHONE ENCOUNTER
Daughter called, mother was the ED the other night.  A CT scan shows infection behind the eye.  She was put on antibiotics which she is not tolerating well.  Vera  is looking for some direction.

## 2023-05-08 ENCOUNTER — TELEPHONE (OUTPATIENT)
Dept: WOUND CARE | Facility: CLINIC | Age: 88
End: 2023-05-08
Payer: MEDICARE

## 2023-05-08 NOTE — TELEPHONE ENCOUNTER
Patient's daughter, Vera, is now questioning if they should keep their appt with Dr Harris today 5/8/23. Patient was recently in the ER and the doctor in the hospital didn't think the patient's ear pain was related to the wound (something that they were going to talk with Dr Harris about today). Patient was also prescribed antibiotics that she is reacting to with bouts of diarrhea so they are hesitant to take her anywhere unless its truly needed. They would appreciate some feedback/ input.    Vera [phone]

## 2023-05-08 NOTE — TELEPHONE ENCOUNTER
Returned call to Vera. She states the patient is feeling weak due to the antibiotics she is taking and does not feel up to coming to the appointment today. Vera states the [facility] wound care nurses assessed the wound in the hospital and did not have any immediate concerns. The patient has another follow up 5/18/23. Today's appointment will be cancelled.

## 2025-02-18 ENCOUNTER — LAB REQUISITION (OUTPATIENT)
Dept: LAB | Facility: CLINIC | Age: OVER 89
End: 2025-02-18
Payer: MEDICARE

## 2025-02-18 DIAGNOSIS — N18.30 CHRONIC KIDNEY DISEASE, STAGE 3 UNSPECIFIED (H): ICD-10-CM

## 2025-02-18 DIAGNOSIS — E03.9 HYPOTHYROIDISM, UNSPECIFIED: ICD-10-CM

## 2025-02-19 LAB
ALBUMIN SERPL BCG-MCNC: 3.4 G/DL (ref 3.5–5.2)
ALP SERPL-CCNC: 48 U/L (ref 40–150)
ALT SERPL W P-5'-P-CCNC: 12 U/L (ref 0–50)
ANION GAP SERPL CALCULATED.3IONS-SCNC: 12 MMOL/L (ref 7–15)
AST SERPL W P-5'-P-CCNC: 28 U/L (ref 0–45)
BILIRUB SERPL-MCNC: 0.3 MG/DL
BUN SERPL-MCNC: 36.4 MG/DL (ref 8–23)
CALCIUM SERPL-MCNC: 9.2 MG/DL (ref 8.8–10.4)
CHLORIDE SERPL-SCNC: 97 MMOL/L (ref 98–107)
CREAT SERPL-MCNC: 1.8 MG/DL (ref 0.51–0.95)
EGFRCR SERPLBLD CKD-EPI 2021: 26 ML/MIN/1.73M2
ERYTHROCYTE [DISTWIDTH] IN BLOOD BY AUTOMATED COUNT: 15.3 % (ref 10–15)
GLUCOSE SERPL-MCNC: 95 MG/DL (ref 70–99)
HCO3 SERPL-SCNC: 26 MMOL/L (ref 22–29)
HCT VFR BLD AUTO: 31.1 % (ref 35–47)
HGB BLD-MCNC: 10.3 G/DL (ref 11.7–15.7)
MCH RBC QN AUTO: 31.5 PG (ref 26.5–33)
MCHC RBC AUTO-ENTMCNC: 33.1 G/DL (ref 31.5–36.5)
MCV RBC AUTO: 95 FL (ref 78–100)
PLATELET # BLD AUTO: 308 10E3/UL (ref 150–450)
POTASSIUM SERPL-SCNC: 4.7 MMOL/L (ref 3.4–5.3)
PROT SERPL-MCNC: 7 G/DL (ref 6.4–8.3)
RBC # BLD AUTO: 3.27 10E6/UL (ref 3.8–5.2)
SODIUM SERPL-SCNC: 135 MMOL/L (ref 135–145)
TSH SERPL DL<=0.005 MIU/L-ACNC: 124 UIU/ML (ref 0.3–4.2)
VIT D+METAB SERPL-MCNC: 38 NG/ML (ref 20–50)
WBC # BLD AUTO: 7.2 10E3/UL (ref 4–11)

## 2025-02-19 PROCEDURE — 84443 ASSAY THYROID STIM HORMONE: CPT | Mod: ORL | Performed by: NURSE PRACTITIONER

## 2025-02-19 PROCEDURE — 85027 COMPLETE CBC AUTOMATED: CPT | Mod: ORL | Performed by: NURSE PRACTITIONER

## 2025-02-19 PROCEDURE — 80053 COMPREHEN METABOLIC PANEL: CPT | Mod: ORL | Performed by: NURSE PRACTITIONER

## 2025-02-19 PROCEDURE — 36415 COLL VENOUS BLD VENIPUNCTURE: CPT | Mod: ORL | Performed by: NURSE PRACTITIONER

## 2025-02-19 PROCEDURE — 82306 VITAMIN D 25 HYDROXY: CPT | Mod: ORL | Performed by: NURSE PRACTITIONER

## 2025-02-19 PROCEDURE — P9604 ONE-WAY ALLOW PRORATED TRIP: HCPCS | Mod: ORL | Performed by: NURSE PRACTITIONER

## 2025-03-16 ENCOUNTER — LAB REQUISITION (OUTPATIENT)
Dept: LAB | Facility: CLINIC | Age: OVER 89
End: 2025-03-16
Payer: MEDICARE

## 2025-03-16 DIAGNOSIS — E03.9 HYPOTHYROIDISM, UNSPECIFIED: ICD-10-CM

## 2025-03-19 PROCEDURE — P9603 ONE-WAY ALLOW PRORATED MILES: HCPCS | Mod: ORL | Performed by: NURSE PRACTITIONER

## 2025-03-19 PROCEDURE — 84443 ASSAY THYROID STIM HORMONE: CPT | Mod: ORL

## 2025-03-19 PROCEDURE — 36415 COLL VENOUS BLD VENIPUNCTURE: CPT | Mod: ORL | Performed by: NURSE PRACTITIONER

## 2025-03-19 PROCEDURE — 36415 COLL VENOUS BLD VENIPUNCTURE: CPT | Mod: ORL

## 2025-03-19 PROCEDURE — P9603 ONE-WAY ALLOW PRORATED MILES: HCPCS | Mod: ORL

## 2025-03-19 PROCEDURE — 84443 ASSAY THYROID STIM HORMONE: CPT | Mod: ORL | Performed by: NURSE PRACTITIONER

## 2025-03-20 LAB — TSH SERPL DL<=0.005 MIU/L-ACNC: 97.49 UIU/ML (ref 0.3–4.2)

## 2025-03-25 ENCOUNTER — LAB REQUISITION (OUTPATIENT)
Dept: LAB | Facility: CLINIC | Age: OVER 89
End: 2025-03-25
Payer: MEDICARE

## 2025-03-25 DIAGNOSIS — E03.9 HYPOTHYROIDISM, UNSPECIFIED: ICD-10-CM

## 2025-03-26 LAB — TSH SERPL DL<=0.005 MIU/L-ACNC: 78.1 UIU/ML (ref 0.3–4.2)

## 2025-03-26 PROCEDURE — 84443 ASSAY THYROID STIM HORMONE: CPT | Mod: ORL | Performed by: NURSE PRACTITIONER

## 2025-03-26 PROCEDURE — 36415 COLL VENOUS BLD VENIPUNCTURE: CPT | Mod: ORL | Performed by: NURSE PRACTITIONER

## 2025-03-26 PROCEDURE — P9603 ONE-WAY ALLOW PRORATED MILES: HCPCS | Mod: ORL | Performed by: NURSE PRACTITIONER
